# Patient Record
Sex: MALE | Race: WHITE | NOT HISPANIC OR LATINO | URBAN - METROPOLITAN AREA
[De-identification: names, ages, dates, MRNs, and addresses within clinical notes are randomized per-mention and may not be internally consistent; named-entity substitution may affect disease eponyms.]

---

## 2017-02-03 ENCOUNTER — TELEPHONE (OUTPATIENT)
Dept: INTERNAL MEDICINE | Facility: CLINIC | Age: 22
End: 2017-02-03

## 2017-02-03 DIAGNOSIS — F98.8 ADD (ATTENTION DEFICIT DISORDER): Primary | ICD-10-CM

## 2017-02-03 RX ORDER — METHYLPHENIDATE HYDROCHLORIDE 36 MG/1
72 TABLET ORAL EVERY MORNING
Qty: 60 TABLET | Refills: 0 | Status: SHIPPED | OUTPATIENT
Start: 2017-02-03 | End: 2017-03-09

## 2017-02-03 RX ORDER — METHYLPHENIDATE HYDROCHLORIDE 36 MG/1
72 TABLET, EXTENDED RELEASE ORAL EVERY MORNING
Qty: 60 TABLET | Refills: 0 | Status: CANCELLED | OUTPATIENT
Start: 2017-02-03

## 2017-02-03 NOTE — TELEPHONE ENCOUNTER
Clinic Action Needed: Yes  Medication Refilled: No per RN Refill Guidelines  Additional Notes: Concerta 36mg CR, take 72 mg po q a.m.  Would like walked over to [facility] pharmacy please.  If able to fill today, would appreciate it.  He could make it last until Monday if needed.  Could you please call him when ordered?  Okay to leave detailed msg.  Thanks   Routed To: RN Pool / AME Peñaloza / DAGOBERTO Murillo RN  F[facility]

## 2017-02-03 NOTE — TELEPHONE ENCOUNTER
Message left for pt that I will be bringing his Rx downstairs in the next 5 minutes and that our pharmacy is open until 5.

## 2017-03-09 DIAGNOSIS — F98.8 ADD (ATTENTION DEFICIT DISORDER): ICD-10-CM

## 2017-03-09 RX ORDER — METHYLPHENIDATE HYDROCHLORIDE 36 MG/1
72 TABLET ORAL EVERY MORNING
Qty: 60 TABLET | Refills: 0 | Status: SHIPPED | OUTPATIENT
Start: 2017-03-09 | End: 2017-04-12

## 2017-03-09 NOTE — TELEPHONE ENCOUNTER
Pt calls to request Concerta ER CR  Last written 2/3/17 #60 with 0 refills  Last office visit 10/20/16.    Please have someone take it to [facility]

## 2017-04-12 DIAGNOSIS — F98.8 ADD (ATTENTION DEFICIT DISORDER): ICD-10-CM

## 2017-04-12 RX ORDER — METHYLPHENIDATE HYDROCHLORIDE 36 MG/1
72 TABLET ORAL EVERY MORNING
Qty: 60 TABLET | Refills: 0 | Status: SHIPPED | OUTPATIENT
Start: 2017-04-12 | End: 2017-05-10

## 2017-04-12 NOTE — TELEPHONE ENCOUNTER
Pt calls to request refill for Methylphenidate-asking to have note to dispense the less expensive generic as one only costs $10 and the other costs over $50.     Methylphenidate ER 36mg      Last Written Prescription Date:  3/9/17  Last Fill Quantity: 60,   # refills: 0  Last Office Visit with [facility] prescribing provider: 10/20/16  Future Office visit:    Next 5 appointments (look out 90 days)     Apr 19, 2017  7:40 AM CDT   SHORT with AME Peck CNP   [facility]    [address] [facility] [address]   [phone]                 CSA on file.   Walk to [facility] and notify pt when dropped off.    Routing refill request to provider for review/approval because:  Drug not on the [facility] refill protocol or controlled substance

## 2017-05-10 ENCOUNTER — OFFICE VISIT (OUTPATIENT)
Dept: INTERNAL MEDICINE | Facility: CLINIC | Age: 22
End: 2017-05-10
Payer: COMMERCIAL

## 2017-05-10 VITALS
HEIGHT: 70 IN | OXYGEN SATURATION: 98 % | BODY MASS INDEX: 29.35 KG/M2 | DIASTOLIC BLOOD PRESSURE: 80 MMHG | WEIGHT: 205 LBS | HEART RATE: 72 BPM | RESPIRATION RATE: 12 BRPM | SYSTOLIC BLOOD PRESSURE: 130 MMHG | TEMPERATURE: 98.5 F

## 2017-05-10 DIAGNOSIS — F98.8 ADD (ATTENTION DEFICIT DISORDER): ICD-10-CM

## 2017-05-10 PROCEDURE — 99213 OFFICE O/P EST LOW 20 MIN: CPT | Performed by: NURSE PRACTITIONER

## 2017-05-10 RX ORDER — METHYLPHENIDATE HYDROCHLORIDE 36 MG/1
72 TABLET ORAL EVERY MORNING
Qty: 60 TABLET | Refills: 0 | Status: SHIPPED | OUTPATIENT
Start: 2017-05-10 | End: 2017-06-15

## 2017-05-10 NOTE — MR AVS SNAPSHOT
"              After Visit Summary   5/10/2017    Preet Gates    MRN: [id number]           Patient Information     Date Of Birth          1995        Visit Information        Provider Department      5/10/2017 8:20 AM Kathie Ramírez APRN CNP [facility]        Today's Diagnoses     ADD (attention deficit disorder)          Care Instructions    Refill on medication     Follow up in a year         Follow-ups after your visit        Who to contact     If you have questions or need follow up information about today's clinic visit or your schedule please contact [facility] directly at [phone].  Normal or non-critical lab and imaging results will be communicated to you by Insplorionhart, letter or phone within 4 business days after the clinic has received the results. If you do not hear from us within 7 days, please contact the clinic through Insplorion[employer] or phone. If you have a critical or abnormal lab result, we will notify you by phone as soon as possible.  Submit refill requests through [employer] or call your pharmacy and they will forward the refill request to us. Please allow 3 business days for your refill to be completed.          Additional Information About Your Visit        MyChart Information     [employer] lets you send messages to your doctor, view your test results, renew your prescriptions, schedule appointments and more. To sign up, go to [email or web address] . Click on \"Log in\" on the left side of the screen, which will take you to the Welcome page. Then click on \"Sign up Now\" on the right side of the page.     You will be asked to enter the access code listed below, as well as some personal information. Please follow the directions to create your username and password.     Your access code is: [email or web address]  Expires: 2017  9:07 AM     Your access code will  in 90 days. If you need help or a new code, please call your [facility] or [phone].      " "  Care EveryWhere ID     This is your Care EveryWhere ID. This could be used by other organizations to access your [facility] medical records  [id number]        Your Vitals Were     Pulse Temperature Respirations Height Pulse Oximetry BMI (Body Mass Index)    72 98.5  F (36.9  C) (Oral) 12 5' 10\" (1.778 m) 98% 29.41 kg/m2       Blood Pressure from Last 3 Encounters:   05/10/17 130/80   10/20/16 122/60   04/19/16 120/72    Weight from Last 3 Encounters:   05/10/17 205 lb (93 kg)   10/20/16 182 lb 1.6 oz (82.6 kg)   04/19/16 194 lb (88 kg)              Today, you had the following     No orders found for display         Where to get your medicines      Some of these will need a paper prescription and others can be bought over the counter.  Ask your nurse if you have questions.     Bring a paper prescription for each of these medications     methylphenidate ER 36 MG CR tablet          Primary Care Provider Office Phone # Fax #    AME Peck [facility] [phone]       [facility] [address] [place] [address]        Thank you!     Thank you for choosing [facility]  for your care. Our goal is always to provide you with excellent care. Hearing back from our patients is one way we can continue to improve our services. Please take a few minutes to complete the written survey that you may receive in the mail after your visit with us. Thank you!             Your Updated Medication List - Protect others around you: Learn how to safely use, store and throw away your medicines at www.disposemymeds.org.          This list is accurate as of: 5/10/17  9:07 AM.  Always use your most recent med list.                   Brand Name Dispense Instructions for use    methylphenidate ER 36 MG CR tablet    CONCERTA    60 tablet    Take 2 tablets (72 mg) by mouth every morning Generic ok       MULTIVITAL PO      Take  by mouth daily as needed.         "

## 2017-05-10 NOTE — NURSING NOTE
"Chief Complaint   Patient presents with     Recheck Medication       Initial /80 (BP Location: Left arm, Patient Position: Chair, Cuff Size: Adult Large)  Pulse 72  Temp 98.5  F (36.9  C) (Oral)  Resp 12  Ht 5' 10\" (1.778 m)  Wt 205 lb (93 kg)  SpO2 98%  BMI 29.41 kg/m2 Estimated body mass index is 29.41 kg/(m^2) as calculated from the following:    Height as of this encounter: 5' 10\" (1.778 m).    Weight as of this encounter: 205 lb (93 kg).  Medication Reconciliation: complete     BEV Schreiber      "

## 2017-05-10 NOTE — PROGRESS NOTES
"  SUBJECTIVE:                                                    Preet Gates is a 21 year old male who presents to clinic today for the following health issues:      Medication Followup of Concerta    Taking Medication as prescribed: yes    Side Effects:  None    Medication Helping Symptoms:  yes         Problem list and histories reviewed & adjusted, as indicated.  Additional history: as documented    Patient Active Problem List   Diagnosis     ADD (attention deficit disorder)     Hyperlipidemia LDL goal <160     No past surgical history on file.    Social History   Substance Use Topics     Smoking status: Never Smoker     Smokeless tobacco: Never Used     Alcohol use 0.0 oz/week     0 Standard drinks or equivalent per week      Comment: social drinker     Family History   Problem Relation Age of Onset     DIABETES Paternal Aunt            Reviewed and updated as needed this visit by clinical staff  Tobacco  Allergies  Meds  Soc Hx      Reviewed and updated as needed this visit by Provider         ROS:  Constitutional, HEENT, cardiovascular, pulmonary, GI, , musculoskeletal, neuro, skin, endocrine and psych systems are negative, except as otherwise noted.    OBJECTIVE:                                                    /80 (BP Location: Left arm, Patient Position: Chair, Cuff Size: Adult Large)  Pulse 72  Temp 98.5  F (36.9  C) (Oral)  Resp 12  Ht 5' 10\" (1.778 m)  Wt 205 lb (93 kg)  SpO2 98%  BMI 29.41 kg/m2  Body mass index is 29.41 kg/(m^2).  GENERAL: alert and no distress  RESP: lungs clear to auscultation - no rales, rhonchi or wheezes  CV: regular rate and rhythm, normal S1 S2, no S3 or S4, no murmur, click or rub, no peripheral edema   ABDOMEN: soft, nontender, and bowel sounds normal  MS: no gross musculoskeletal defects noted, no edema  NEURO: Normal strength and tone, mentation intact and speech normal  PSYCH: mentation appears normal, affect normal/bright    Diagnostic Test " Results:  none      ASSESSMENT/PLAN:                                                        ICD-10-CM    1. ADD (attention deficit disorder) F98.8 methylphenidate ER (CONCERTA) 36 MG CR tablet             Patient Instructions   Refill on medication     Follow up in a year       AME Peck [facility]

## 2017-06-15 ENCOUNTER — TELEPHONE (OUTPATIENT)
Dept: INTERNAL MEDICINE | Facility: CLINIC | Age: 22
End: 2017-06-15

## 2017-06-15 DIAGNOSIS — F98.8 ADD (ATTENTION DEFICIT DISORDER): ICD-10-CM

## 2017-06-15 RX ORDER — METHYLPHENIDATE HYDROCHLORIDE 36 MG/1
72 TABLET ORAL EVERY MORNING
Qty: 60 TABLET | Refills: 0 | Status: SHIPPED | OUTPATIENT
Start: 2017-06-15 | End: 2017-07-18

## 2017-06-15 NOTE — TELEPHONE ENCOUNTER
Concerta 36 mg CR      Last Written Prescription Date:  5/10/17  Last Fill Quantity: 60,   # refills: 0  Last Office Visit with [facility] prescribing provider: 5/10/17  Future Office visit:       Routing refill request to provider for review/approval because:  Drug not on the [facility] or  [facility] refill protocol or controlled substance  Signed CSA on file.  CARLIN Morris R.N.

## 2017-06-15 NOTE — TELEPHONE ENCOUNTER
Reason for Call:  Medication or medication refill:    Do you use a [facility]?  Name of the pharmacy and phone number for the current request:  [facility] [address] ([place]) - [phone]    Name of the medication requested: methylphenidate ER (CONCERTA) 36 MG CR tablet    Other request: patient stated that he needs to pick this medication up by tomorrow    Can we leave a detailed message on this number? YES    Phone number patient can be reached at: Cell number on file:    Telephone Information:   Mobile [phone]       Best Time: anytime    Call taken on 6/15/2017 at 7:08 AM by Addie Mcmullen

## 2017-07-18 DIAGNOSIS — F98.8 ATTENTION DEFICIT DISORDER, UNSPECIFIED HYPERACTIVITY PRESENCE: ICD-10-CM

## 2017-07-18 RX ORDER — METHYLPHENIDATE HYDROCHLORIDE 36 MG/1
72 TABLET ORAL EVERY MORNING
Qty: 60 TABLET | Refills: 0 | Status: SHIPPED | OUTPATIENT
Start: 2017-07-18 | End: 2017-08-21

## 2017-07-18 NOTE — TELEPHONE ENCOUNTER
Concerta      Last Written Prescription Date:  06/15/17  Last Fill Quantity: 60,   # refills: 0  Last [facility] Visit with [facility] provider: 05/10/17  Future Office visit:       Routing refill request to provider for review/approval because:  Drug not on the [facility] refill protocol or controlled substance    Please bring down to [facility] when done

## 2017-08-21 ENCOUNTER — TELEPHONE (OUTPATIENT)
Dept: INTERNAL MEDICINE | Facility: CLINIC | Age: 22
End: 2017-08-21

## 2017-08-21 DIAGNOSIS — F98.8 ATTENTION DEFICIT DISORDER, UNSPECIFIED HYPERACTIVITY PRESENCE: ICD-10-CM

## 2017-08-21 RX ORDER — METHYLPHENIDATE HYDROCHLORIDE 36 MG/1
72 TABLET ORAL EVERY MORNING
Qty: 60 TABLET | Refills: 0 | Status: SHIPPED | OUTPATIENT
Start: 2017-08-21 | End: 2017-09-22

## 2017-08-21 NOTE — TELEPHONE ENCOUNTER
Reason for Call:  Medication or medication refill:    Do you use a [facility]?  Name of the pharmacy and phone number for the current request:  [facility] [address] ([place]) - [phone]    Name of the medication requested: concerta    Other request: none    Can we leave a detailed message on this number? YES    Phone number patient can be reached at: Home number on file [phone] (home)    Best Time: any    Call taken on 8/21/2017 at 8:21 AM by Rachelle Martin

## 2017-08-21 NOTE — TELEPHONE ENCOUNTER
Concerta      Last Written Prescription Date:  7/18/17  Last Fill Quantity: 60,   # refills: 0  Last Office Visit with [facility] provider: 5/10/17  Future Office visit:       Routing refill request to provider for review/approval because:  Drug not on the [facility] refill protocol or controlled substance.  Signed CSA form in chart.  [facility].

## 2017-09-12 ENCOUNTER — COMMUNICATION - HEALTHEAST (OUTPATIENT)
Dept: INTERNAL MEDICINE | Facility: CLINIC | Age: 22
End: 2017-09-12

## 2017-09-21 ENCOUNTER — TELEPHONE (OUTPATIENT)
Dept: INTERNAL MEDICINE | Facility: CLINIC | Age: 22
End: 2017-09-21

## 2017-09-21 DIAGNOSIS — F98.8 ATTENTION DEFICIT DISORDER, UNSPECIFIED HYPERACTIVITY PRESENCE: ICD-10-CM

## 2017-09-21 NOTE — TELEPHONE ENCOUNTER
Reason for Call:  Medication or medication refill:Refill -     Do you use a [facility]?  Name of the pharmacy and phone number for the current request:  [facility] [address] ([place]) - [phone]    Name of the medication requested: methylphenidate ER (CONCERTA) 36 MG CR tablet    Other request: 3-4 days left.  Pt also would like to know when he is due for a MED check appt with provider.    Can we leave a detailed message on this number? YES    Phone number patient can be reached at: Cell number on file:    Telephone Information:   Mobile [phone]       Best Time: anytime    Call taken on 9/21/2017 at 4:52 PM by TED ESCOTO

## 2017-09-22 RX ORDER — METHYLPHENIDATE HYDROCHLORIDE 36 MG/1
72 TABLET ORAL EVERY MORNING
Qty: 60 TABLET | Refills: 0 | Status: SHIPPED | OUTPATIENT
Start: 2017-09-22

## 2017-09-22 NOTE — TELEPHONE ENCOUNTER
Last refill-8/21/17-#60    Last OV-5/10/17    CSA on file       Per 5/10/17 dict-Follow up in a year

## 2017-09-22 NOTE — TELEPHONE ENCOUNTER
Called pt-left message relaying below regarding appt info, and that rx will be brought down to pharm